# Patient Record
Sex: MALE | Race: WHITE | NOT HISPANIC OR LATINO | Employment: OTHER | URBAN - METROPOLITAN AREA
[De-identification: names, ages, dates, MRNs, and addresses within clinical notes are randomized per-mention and may not be internally consistent; named-entity substitution may affect disease eponyms.]

---

## 2023-04-19 LAB — URATE (MG/DL) IN SER/PLAS: 7.8 MG/DL (ref 4–7.5)

## 2023-07-06 LAB
ALANINE AMINOTRANSFERASE (SGPT) (U/L) IN SER/PLAS: 22 U/L (ref 10–52)
ALBUMIN (G/DL) IN SER/PLAS: 4.3 G/DL (ref 3.4–5)
ALKALINE PHOSPHATASE (U/L) IN SER/PLAS: 70 U/L (ref 33–136)
ANION GAP IN SER/PLAS: 11 MMOL/L (ref 10–20)
ASPARTATE AMINOTRANSFERASE (SGOT) (U/L) IN SER/PLAS: 21 U/L (ref 9–39)
BILIRUBIN TOTAL (MG/DL) IN SER/PLAS: 0.4 MG/DL (ref 0–1.2)
CALCIUM (MG/DL) IN SER/PLAS: 9.8 MG/DL (ref 8.6–10.6)
CARBON DIOXIDE, TOTAL (MMOL/L) IN SER/PLAS: 29 MMOL/L (ref 21–32)
CHLORIDE (MMOL/L) IN SER/PLAS: 104 MMOL/L (ref 98–107)
CREATININE (MG/DL) IN SER/PLAS: 0.99 MG/DL (ref 0.5–1.3)
ERYTHROCYTE DISTRIBUTION WIDTH (RATIO) BY AUTOMATED COUNT: 12.1 % (ref 11.5–14.5)
ERYTHROCYTE MEAN CORPUSCULAR HEMOGLOBIN CONCENTRATION (G/DL) BY AUTOMATED: 34.3 G/DL (ref 32–36)
ERYTHROCYTE MEAN CORPUSCULAR VOLUME (FL) BY AUTOMATED COUNT: 96 FL (ref 80–100)
ERYTHROCYTES (10*6/UL) IN BLOOD BY AUTOMATED COUNT: 3.75 X10E12/L (ref 4.5–5.9)
GFR MALE: 81 ML/MIN/1.73M2
GLUCOSE (MG/DL) IN SER/PLAS: 92 MG/DL (ref 74–99)
HEMATOCRIT (%) IN BLOOD BY AUTOMATED COUNT: 35.9 % (ref 41–52)
HEMOGLOBIN (G/DL) IN BLOOD: 12.3 G/DL (ref 13.5–17.5)
LEUKOCYTES (10*3/UL) IN BLOOD BY AUTOMATED COUNT: 5.9 X10E9/L (ref 4.4–11.3)
NRBC (PER 100 WBCS) BY AUTOMATED COUNT: 0 /100 WBC (ref 0–0)
PLATELETS (10*3/UL) IN BLOOD AUTOMATED COUNT: 262 X10E9/L (ref 150–450)
POTASSIUM (MMOL/L) IN SER/PLAS: 4.7 MMOL/L (ref 3.5–5.3)
PROTEIN TOTAL: 6.7 G/DL (ref 6.4–8.2)
SODIUM (MMOL/L) IN SER/PLAS: 139 MMOL/L (ref 136–145)
THYROTROPIN (MIU/L) IN SER/PLAS BY DETECTION LIMIT <= 0.05 MIU/L: 1.24 MIU/L (ref 0.44–3.98)
UREA NITROGEN (MG/DL) IN SER/PLAS: 18 MG/DL (ref 6–23)

## 2023-10-11 DIAGNOSIS — E78.5 MILD HYPERLIPIDEMIA: Primary | ICD-10-CM

## 2023-10-15 RX ORDER — ATORVASTATIN CALCIUM 20 MG/1
20 TABLET, FILM COATED ORAL DAILY
Qty: 90 TABLET | Refills: 3 | Status: SHIPPED | OUTPATIENT
Start: 2023-10-15

## 2023-12-30 DIAGNOSIS — I10 PRIMARY HYPERTENSION: ICD-10-CM

## 2024-01-05 RX ORDER — LISINOPRIL 20 MG/1
20 TABLET ORAL DAILY
Qty: 90 TABLET | Refills: 1 | Status: SHIPPED | OUTPATIENT
Start: 2024-01-05 | End: 2024-03-13

## 2024-02-27 DIAGNOSIS — I10 PRIMARY HYPERTENSION: Primary | ICD-10-CM

## 2024-02-28 PROBLEM — R91.8 PULMONARY NODULES/LESIONS, MULTIPLE: Status: RESOLVED | Noted: 2024-02-28 | Resolved: 2024-02-28

## 2024-02-28 PROBLEM — R06.09 EXERTIONAL DYSPNEA: Status: RESOLVED | Noted: 2024-02-28 | Resolved: 2024-02-28

## 2024-02-28 PROBLEM — H93.13 BILATERAL TINNITUS: Status: RESOLVED | Noted: 2022-12-20 | Resolved: 2024-02-28

## 2024-02-28 PROBLEM — N40.0 ENLARGED PROSTATE WITHOUT LOWER URINARY TRACT SYMPTOMS (LUTS): Status: RESOLVED | Noted: 2024-02-28 | Resolved: 2024-02-28

## 2024-02-28 PROBLEM — R09.81 SINUS CONGESTION: Status: RESOLVED | Noted: 2024-02-28 | Resolved: 2024-02-28

## 2024-02-28 PROBLEM — I49.3 FREQUENT PVCS: Status: RESOLVED | Noted: 2024-02-28 | Resolved: 2024-02-28

## 2024-02-28 PROBLEM — M17.12 ARTHRITIS OF KNEE, LEFT: Status: ACTIVE | Noted: 2024-02-28

## 2024-02-28 PROBLEM — U07.1 COVID-19: Status: RESOLVED | Noted: 2024-02-28 | Resolved: 2024-02-28

## 2024-02-28 PROBLEM — I10 HYPERTENSION: Status: ACTIVE | Noted: 2024-02-28

## 2024-02-28 PROBLEM — R09.81 NASAL CONGESTION: Status: RESOLVED | Noted: 2024-02-28 | Resolved: 2024-02-28

## 2024-02-28 PROBLEM — H90.3 SENSORINEURAL HEARING LOSS, BILATERAL: Status: ACTIVE | Noted: 2022-12-20

## 2024-02-28 PROBLEM — E78.5 HYPERLIPIDEMIA: Status: ACTIVE | Noted: 2024-02-28

## 2024-02-28 PROBLEM — L53.9 LEG ERYTHEMA: Status: RESOLVED | Noted: 2024-02-28 | Resolved: 2024-02-28

## 2024-02-28 PROBLEM — M79.89 LEG SWELLING: Status: RESOLVED | Noted: 2024-02-28 | Resolved: 2024-02-28

## 2024-02-28 PROBLEM — M25.562 LEFT KNEE PAIN: Status: RESOLVED | Noted: 2024-02-28 | Resolved: 2024-02-28

## 2024-02-28 PROBLEM — J02.9 SORE THROAT: Status: RESOLVED | Noted: 2024-02-28 | Resolved: 2024-02-28

## 2024-02-28 PROBLEM — E55.9 VITAMIN D DEFICIENCY: Status: ACTIVE | Noted: 2024-02-28

## 2024-02-28 RX ORDER — FLUTICASONE FUROATE AND VILANTEROL TRIFENATATE 100; 25 UG/1; UG/1
1 POWDER RESPIRATORY (INHALATION) DAILY
COMMUNITY

## 2024-02-28 RX ORDER — ASCORBIC ACID 500 MG
TABLET ORAL
COMMUNITY
Start: 2004-01-22

## 2024-02-28 RX ORDER — CHLORTHALIDONE 25 MG/1
1 TABLET ORAL DAILY
COMMUNITY
Start: 2021-11-23

## 2024-02-28 RX ORDER — CHLORTHALIDONE 25 MG/1
25 TABLET ORAL DAILY
Qty: 90 TABLET | Refills: 1 | Status: SHIPPED | OUTPATIENT
Start: 2024-02-28 | End: 2024-05-08

## 2024-02-28 RX ORDER — FLECAINIDE ACETATE 50 MG/1
TABLET ORAL
COMMUNITY
Start: 2018-07-19 | End: 2024-05-17 | Stop reason: SDUPTHER

## 2024-02-28 RX ORDER — ASPIRIN 81 MG/1
1 TABLET ORAL DAILY
COMMUNITY
Start: 2016-11-17

## 2024-03-11 DIAGNOSIS — I10 PRIMARY HYPERTENSION: ICD-10-CM

## 2024-03-13 RX ORDER — LISINOPRIL 20 MG/1
20 TABLET ORAL DAILY
Qty: 90 TABLET | Refills: 0 | Status: SHIPPED | OUTPATIENT
Start: 2024-03-13 | End: 2024-05-09

## 2024-05-07 DIAGNOSIS — I10 PRIMARY HYPERTENSION: ICD-10-CM

## 2024-05-08 DIAGNOSIS — I10 PRIMARY HYPERTENSION: ICD-10-CM

## 2024-05-08 RX ORDER — CHLORTHALIDONE 25 MG/1
25 TABLET ORAL DAILY
Qty: 100 TABLET | Refills: 1 | Status: SHIPPED | OUTPATIENT
Start: 2024-05-08

## 2024-05-09 RX ORDER — LISINOPRIL 20 MG/1
20 TABLET ORAL DAILY
Qty: 90 TABLET | Refills: 3 | Status: SHIPPED | OUTPATIENT
Start: 2024-05-09

## 2024-05-17 DIAGNOSIS — I49.3 PVC (PREMATURE VENTRICULAR CONTRACTION): ICD-10-CM

## 2024-05-17 RX ORDER — FLECAINIDE ACETATE 50 MG/1
50 TABLET ORAL 2 TIMES DAILY
Qty: 180 TABLET | Refills: 3 | Status: SHIPPED | OUTPATIENT
Start: 2024-05-17 | End: 2025-05-17

## 2024-06-03 ENCOUNTER — OFFICE VISIT (OUTPATIENT)
Dept: PRIMARY CARE | Facility: CLINIC | Age: 74
End: 2024-06-03
Payer: COMMERCIAL

## 2024-06-03 VITALS
HEART RATE: 70 BPM | SYSTOLIC BLOOD PRESSURE: 124 MMHG | DIASTOLIC BLOOD PRESSURE: 64 MMHG | RESPIRATION RATE: 18 BRPM | WEIGHT: 206 LBS | BODY MASS INDEX: 29.56 KG/M2 | OXYGEN SATURATION: 99 %

## 2024-06-03 DIAGNOSIS — Z13.1 DIABETES MELLITUS SCREENING: ICD-10-CM

## 2024-06-03 DIAGNOSIS — I10 PRIMARY HYPERTENSION: ICD-10-CM

## 2024-06-03 DIAGNOSIS — N52.9 ERECTILE DYSFUNCTION, UNSPECIFIED ERECTILE DYSFUNCTION TYPE: ICD-10-CM

## 2024-06-03 DIAGNOSIS — Z00.00 MEDICARE ANNUAL WELLNESS VISIT, SUBSEQUENT: ICD-10-CM

## 2024-06-03 DIAGNOSIS — Z12.5 SCREENING FOR PROSTATE CANCER: ICD-10-CM

## 2024-06-03 DIAGNOSIS — Z00.00 ROUTINE GENERAL MEDICAL EXAMINATION AT HEALTH CARE FACILITY: Primary | ICD-10-CM

## 2024-06-03 DIAGNOSIS — E78.5 HYPERLIPIDEMIA, UNSPECIFIED HYPERLIPIDEMIA TYPE: ICD-10-CM

## 2024-06-03 DIAGNOSIS — Z11.59 NEED FOR HEPATITIS C SCREENING TEST: ICD-10-CM

## 2024-06-03 PROCEDURE — 1170F FXNL STATUS ASSESSED: CPT | Performed by: PHYSICIAN ASSISTANT

## 2024-06-03 PROCEDURE — 1036F TOBACCO NON-USER: CPT | Performed by: PHYSICIAN ASSISTANT

## 2024-06-03 PROCEDURE — 99214 OFFICE O/P EST MOD 30 MIN: CPT | Performed by: PHYSICIAN ASSISTANT

## 2024-06-03 PROCEDURE — 3074F SYST BP LT 130 MM HG: CPT | Performed by: PHYSICIAN ASSISTANT

## 2024-06-03 PROCEDURE — 3078F DIAST BP <80 MM HG: CPT | Performed by: PHYSICIAN ASSISTANT

## 2024-06-03 PROCEDURE — G0439 PPPS, SUBSEQ VISIT: HCPCS | Performed by: PHYSICIAN ASSISTANT

## 2024-06-03 PROCEDURE — 1124F ACP DISCUSS-NO DSCNMKR DOCD: CPT | Performed by: PHYSICIAN ASSISTANT

## 2024-06-03 PROCEDURE — 1159F MED LIST DOCD IN RCRD: CPT | Performed by: PHYSICIAN ASSISTANT

## 2024-06-03 RX ORDER — SILDENAFIL 100 MG/1
100 TABLET, FILM COATED ORAL DAILY PRN
Qty: 12 TABLET | Refills: 1 | Status: SHIPPED | OUTPATIENT
Start: 2024-06-03 | End: 2025-06-03

## 2024-06-03 RX ORDER — SILDENAFIL 100 MG/1
100 TABLET, FILM COATED ORAL DAILY PRN
Qty: 12 TABLET | Refills: 1 | Status: SHIPPED | OUTPATIENT
Start: 2024-06-03 | End: 2024-06-03 | Stop reason: SDUPTHER

## 2024-06-03 ASSESSMENT — ENCOUNTER SYMPTOMS
ENDOCRINE NEGATIVE: 1
LOSS OF SENSATION IN FEET: 0
DEPRESSION: 0
PALPITATIONS: 0
PSYCHIATRIC NEGATIVE: 1
OCCASIONAL FEELINGS OF UNSTEADINESS: 0
CARDIOVASCULAR NEGATIVE: 1
MUSCULOSKELETAL NEGATIVE: 1
COUGH: 0
ABDOMINAL PAIN: 0
GASTROINTESTINAL NEGATIVE: 1
ALLERGIC/IMMUNOLOGIC NEGATIVE: 1
NERVOUS/ANXIOUS: 0
HEMATOLOGIC/LYMPHATIC NEGATIVE: 1
HEADACHES: 0
VOMITING: 0
BACK PAIN: 0
DIZZINESS: 0
ARTHRALGIAS: 0
EYES NEGATIVE: 1
NAUSEA: 0
SHORTNESS OF BREATH: 0
RESPIRATORY NEGATIVE: 1
NEUROLOGICAL NEGATIVE: 1
CONSTITUTIONAL NEGATIVE: 1
WHEEZING: 0

## 2024-06-03 ASSESSMENT — ACTIVITIES OF DAILY LIVING (ADL)
DOING_HOUSEWORK: INDEPENDENT
TAKING_MEDICATION: INDEPENDENT
GROCERY_SHOPPING: INDEPENDENT
BATHING: INDEPENDENT
MANAGING_FINANCES: INDEPENDENT
DRESSING: INDEPENDENT

## 2024-06-03 ASSESSMENT — PATIENT HEALTH QUESTIONNAIRE - PHQ9
2. FEELING DOWN, DEPRESSED OR HOPELESS: NOT AT ALL
1. LITTLE INTEREST OR PLEASURE IN DOING THINGS: NOT AT ALL
SUM OF ALL RESPONSES TO PHQ9 QUESTIONS 1 AND 2: 0

## 2024-06-03 NOTE — PROGRESS NOTES
Subjective   Reason for Visit: Jarrod Mosley Jr. is an 73 y.o. male here for a Medicare Wellness visit.     Past Medical, Surgical, and Family History reviewed and updated in chart.    Reviewed all medications by prescribing practitioner or clinical pharmacist (such as prescriptions, OTCs, herbal therapies and supplements) and documented in the medical record.    HPI  The patient is being seen for the subsequent annual wellness visit.   Past Medical, Surgical and Family History: reviewed and updated in chart.   Interval History: Patient has not been hospitalized previously.   Medications and Supplements:   No, the patient is not using opioids.   Patient Self Assessment of Health Status: good.   Tobacco use: Non-User   Alcohol use: User   Illicit drug use: Non-User   Current diet: well balanced diet,~does consume adequate fluids~and~does not consume caffeine.   Exercise Frequency: regularly.   Depression/Suicide Screening: ~.   During the past 2 weeks, the patient has not felt down, depressed or hopeless.~   During the past 2 weeks, the patient has not felt little interest or pleasure in doing things.    Hearing Impairment: none.   Cognitive Impairment: No cognitive impairment observed,~patient or family reported no cognitive impairment.      Bathing: performs independently.   Dressing: performs independently.   Walking: performs independently.   Toileting: performs independently.   Feeding: performs independently.   Personal Hygiene: performs independently.   Bowels: continent.   Bladder: continent.   Managing Finances: performs independently.   Shopping: performs independently.   Managing Medications: performs independently.   Housework / Basic Home Maintenance: performs independently.   Handling Transportation: performs independently.   Preparing Meals: performs independently.   Using the Telephone/ Communication Devices: performs independently.    Falls Risk Screening:. JARROD has not fallen in the last 6  months.   Home safety risk factors: none.   Advance directives:. Patient has living will.~Patient has healthcare POA.         JALEESA NG, 73 year old male is here today for follow up and medicare wellness with physical   hx a fib, HTN- stable on medications - no CP no SOB no edema- sees cardiology   hyperlipidemia- on statin - atorvastatin 20 mg daily - no SEs - no muscle aches - last lipid 11/2022-  patient states feeling well otherwise  denies fever, chills, N/V, headache, dizziness, CP, SOB, palpitations, edema, numbness, tingling, weakness  A chaperone was offered to the patient for the physical exam /  exam and was declined      Additional Information: Discussed need to get copy of Advance Directives to scan to chart.   Health has been good. Here for Medicare Wellness/follow up   sees cardiology-  on Flecainide dose BID _     Patient Care Team:  Doris Pop PA-C as PCP - General  Doris Pop PA-C as PCP - [employer] PCP      Mental Status Exam   Date/Time: 5/5   Place: 5/5   Registration: 3/3   Serial Sevens: 5/5   Naming 2 Objects: 2/2   Repeating a Sentence: 1/1   Write a Sentence: 1/1   3-Stage Verbal Command: 3/3   Written Comment: 1/1   Copy a Design: 1/1   Recall: 3/3.   TOTAL SCORE: 30 / 30   PHQ-9     [id number] 09:37AM [id number] 10:09AM   PHQ-9 #1. Little interest or pleasure in doing things 0-Not at all 0-Not at all   PHQ-9 #2. Feeling down, depressed, or hopelesS 0-Not at all 0-Not at all   PHQ-9 #3. Trouble falling or staying asleep, or sleeping too much 0-Not at all 0-Not at all   PHQ-9 #4. Feeling tired or having little energy 0-Not at all 0-Not at all   PHQ-9 #5. Poor appetite or overeating 0-Not at all 0-Not at all   PHQ-9 #6. Feeling bad about yourself or you are a failure or that you have let yourself or your family down 0-Not at all 0-Not at all   PHQ-9 #7. Trouble concentrating on things, such as reading the newspaper or watch television 0-Not at all 0-Not  at all   PHQ-9 #8. Moving or speaking so slowly that other people could have noticed. Or the opposite-being so fidgety or restless that you have been moving around a lot more than usual 0-Not at all 0-Not at all   PHQ-9 #9. Thoughts that you would be better off dead, or of hurting yourself 0-Not at all 0-Not at all   PHQ-9 #10. If you checked off any problems, how difficult have these problems made it for you to do your work, take care of things at home, or get along with other people? Not difficult at all Not difficult at all   PHQ-9 Total Score (Please update problem list based on total score) 0 0   PHQ-9 Depression Severity In Remission (0-4) Mild (5-9)         Review of Systems   Constitutional: Negative.    HENT: Negative.     Eyes: Negative.    Respiratory: Negative.  Negative for cough, shortness of breath and wheezing.    Cardiovascular: Negative.  Negative for chest pain and palpitations.   Gastrointestinal: Negative.  Negative for abdominal pain, nausea and vomiting.   Endocrine: Negative.    Genitourinary: Negative.    Musculoskeletal: Negative.  Negative for arthralgias and back pain.   Skin: Negative.  Negative for rash.   Allergic/Immunologic: Negative.    Neurological: Negative.  Negative for dizziness and headaches.   Hematological: Negative.    Psychiatric/Behavioral: Negative.  The patient is not nervous/anxious.        Objective   Vitals:  /64 (BP Location: Left arm, Patient Position: Sitting)   Pulse 70   Resp 18   Wt 93.4 kg (206 lb)   SpO2 99%   BMI 29.56 kg/m²       Physical Exam  Vitals and nursing note reviewed.   Constitutional:       Appearance: Normal appearance. He is normal weight.   HENT:      Head: Normocephalic and atraumatic.   Cardiovascular:      Rate and Rhythm: Normal rate and regular rhythm.      Heart sounds: Normal heart sounds.   Pulmonary:      Effort: Pulmonary effort is normal.      Breath sounds: Normal breath sounds.   Abdominal:      General: Bowel sounds  are normal.      Palpations: Abdomen is soft.   Genitourinary:     Comments: defer  Musculoskeletal:         General: Normal range of motion.      Cervical back: Neck supple.   Skin:     General: Skin is warm.   Neurological:      General: No focal deficit present.      Mental Status: He is alert and oriented to person, place, and time.   Psychiatric:         Mood and Affect: Mood normal.         Behavior: Behavior normal.         Thought Content: Thought content normal.         Judgment: Judgment normal.       Assessment/Plan   Problem List Items Addressed This Visit       Hyperlipidemia    Relevant Orders    Lipid Panel    Hypertension     Other Visit Diagnoses       Routine general medical examination at health care facility    -  Primary    Relevant Orders    1 Year Follow Up In Primary Care - Wellness Exam    Medicare annual wellness visit, subsequent        Diabetes mellitus screening        Relevant Orders    Comprehensive Metabolic Panel    Need for hepatitis C screening test        Relevant Orders    Hepatitis C antibody    Screening for prostate cancer        Relevant Orders    Prostate Specific Antigen, Screen    Erectile dysfunction, unspecified erectile dysfunction type        Relevant Medications    sildenafil (Viagra) 100 mg tablet          Medicare wellness   - patient stable and healthy  - discussed healthy diet and exercise plan   - continue medications as directed, SEs, risks and options discussed   - f/u with specialists as directed   - dental and vision exams, f/u as directed   - Labs ordered today, will call when results are available   -Vaccinations uTD   -Follow-up annual exam yearly   -Colon cancer screening done 4/2023 sabrina and EGD   -Follow-up in one week to discuss all results      • Hypertension (401.9) (I10)  -stable  -good control  -Continue current medications, side effects, risks and options discussed, patient aware   -Encouraged dietary sodium restriction/ DASH diet  -Recommend  regular aerobic exercise  -Discussed need and benefit for weight loss  -Recheck in 1-2 months or sooner should any symptoms or problems arise  - Goal of blood pressure less than 130/80      • Hyperlipidemia (272.4) (E78.5)  -Continue current medication as directed  -Encouraged following a low-fat low-cholesterol diet   -Discussed the benefits of regular aerobic exercise and weight loss  -Encouraged following a low-carb healthy oil intake diet       • Frequent PVCs (427.69) (I49.3)  -stable followed by cardiology - follow up in June 2024    -ER- trail viagra - 1/2 tab as needed   -Discussed medication dosage, usage, goals of therapy, and side effects       f/u for fasting labs due nov - f/u in nov         ?? gout - left great toe pain at times- no pain today - elevated uric acid 7.8 4/2023- pt does not wish medication at this time         Results of my examination, clinical findings, impression and diagnoses discussed with the patient as well as results of the latest test/lab work. The patient was in agreement with the plan and verbalized a good understanding of instructions and plans for treatment. At the end of the visit today, the patient felt that all questions had been answered satisfactorily. The patient was pleased with the visit and very appreciative for the care rendered.        **Patient Discussion/Summary     Risk Factors Identified During Visit: None.   Influenza: influenza vaccine was previously given.   Pneumovax 23/Prevnar 15: Pneumovax 23/Prevnar 15 vaccine was previously given.   Prevnar 20: Prevnar 20 vaccine was previously given.   Shingles Vaccine: Shingles vaccine was previously given~and~Shingles vaccine was recommended.   Prostate cancer screening: Screening is current.   Colorectal Cancer Screening: screening is current.   Abdominal Aortic Aneurysm screening: screening not indicated.   HIV screening: screening not indicated.

## 2024-06-05 ENCOUNTER — LAB (OUTPATIENT)
Dept: LAB | Facility: LAB | Age: 74
End: 2024-06-05
Payer: COMMERCIAL

## 2024-06-05 DIAGNOSIS — Z13.1 DIABETES MELLITUS SCREENING: ICD-10-CM

## 2024-06-05 DIAGNOSIS — Z11.59 NEED FOR HEPATITIS C SCREENING TEST: ICD-10-CM

## 2024-06-05 DIAGNOSIS — E78.5 HYPERLIPIDEMIA, UNSPECIFIED HYPERLIPIDEMIA TYPE: ICD-10-CM

## 2024-06-05 DIAGNOSIS — Z12.5 SCREENING FOR PROSTATE CANCER: ICD-10-CM

## 2024-06-05 LAB
ALBUMIN SERPL BCP-MCNC: 4.3 G/DL (ref 3.4–5)
ALP SERPL-CCNC: 68 U/L (ref 33–136)
ALT SERPL W P-5'-P-CCNC: 22 U/L (ref 10–52)
ANION GAP SERPL CALC-SCNC: 15 MMOL/L (ref 10–20)
AST SERPL W P-5'-P-CCNC: 23 U/L (ref 9–39)
BILIRUB SERPL-MCNC: 0.6 MG/DL (ref 0–1.2)
BUN SERPL-MCNC: 23 MG/DL (ref 6–23)
CALCIUM SERPL-MCNC: 9.2 MG/DL (ref 8.6–10.6)
CHLORIDE SERPL-SCNC: 101 MMOL/L (ref 98–107)
CHOLEST SERPL-MCNC: 142 MG/DL (ref 0–199)
CHOLESTEROL/HDL RATIO: 3.1
CO2 SERPL-SCNC: 27 MMOL/L (ref 21–32)
CREAT SERPL-MCNC: 1.07 MG/DL (ref 0.5–1.3)
EGFRCR SERPLBLD CKD-EPI 2021: 73 ML/MIN/1.73M*2
GLUCOSE SERPL-MCNC: 108 MG/DL (ref 74–99)
HCV AB SER QL: NONREACTIVE
HDLC SERPL-MCNC: 46.5 MG/DL
LDLC SERPL CALC-MCNC: 76 MG/DL
NON HDL CHOLESTEROL: 96 MG/DL (ref 0–149)
POTASSIUM SERPL-SCNC: 4.2 MMOL/L (ref 3.5–5.3)
PROT SERPL-MCNC: 6.8 G/DL (ref 6.4–8.2)
PSA SERPL-MCNC: 0.57 NG/ML
SODIUM SERPL-SCNC: 139 MMOL/L (ref 136–145)
TRIGL SERPL-MCNC: 100 MG/DL (ref 0–149)
VLDL: 20 MG/DL (ref 0–40)

## 2024-06-05 PROCEDURE — 86803 HEPATITIS C AB TEST: CPT

## 2024-06-05 PROCEDURE — 36415 COLL VENOUS BLD VENIPUNCTURE: CPT

## 2024-06-05 PROCEDURE — 80061 LIPID PANEL: CPT

## 2024-06-05 PROCEDURE — G0103 PSA SCREENING: HCPCS

## 2024-06-05 PROCEDURE — 80053 COMPREHEN METABOLIC PANEL: CPT

## 2024-06-27 ENCOUNTER — APPOINTMENT (OUTPATIENT)
Dept: CARDIOLOGY | Facility: CLINIC | Age: 74
End: 2024-06-27
Payer: COMMERCIAL

## 2024-06-28 DIAGNOSIS — E78.5 MILD HYPERLIPIDEMIA: ICD-10-CM

## 2024-07-01 RX ORDER — ATORVASTATIN CALCIUM 20 MG/1
20 TABLET, FILM COATED ORAL DAILY
Qty: 100 TABLET | Refills: 2 | Status: SHIPPED | OUTPATIENT
Start: 2024-07-01

## 2024-07-11 ENCOUNTER — OFFICE VISIT (OUTPATIENT)
Dept: CARDIOLOGY | Facility: CLINIC | Age: 74
End: 2024-07-11
Payer: COMMERCIAL

## 2024-07-11 ENCOUNTER — HOSPITAL ENCOUNTER (OUTPATIENT)
Dept: CARDIOLOGY | Facility: HOSPITAL | Age: 74
Discharge: HOME | End: 2024-07-11
Payer: COMMERCIAL

## 2024-07-11 VITALS
HEIGHT: 70 IN | TEMPERATURE: 97.9 F | BODY MASS INDEX: 29.78 KG/M2 | DIASTOLIC BLOOD PRESSURE: 72 MMHG | WEIGHT: 208 LBS | HEART RATE: 67 BPM | SYSTOLIC BLOOD PRESSURE: 126 MMHG

## 2024-07-11 DIAGNOSIS — I49.3 PVC (PREMATURE VENTRICULAR CONTRACTION): Primary | ICD-10-CM

## 2024-07-11 PROCEDURE — 1160F RVW MEDS BY RX/DR IN RCRD: CPT | Performed by: NURSE PRACTITIONER

## 2024-07-11 PROCEDURE — 1036F TOBACCO NON-USER: CPT | Performed by: NURSE PRACTITIONER

## 2024-07-11 PROCEDURE — 99214 OFFICE O/P EST MOD 30 MIN: CPT | Performed by: NURSE PRACTITIONER

## 2024-07-11 PROCEDURE — 3074F SYST BP LT 130 MM HG: CPT | Performed by: NURSE PRACTITIONER

## 2024-07-11 PROCEDURE — 1159F MED LIST DOCD IN RCRD: CPT | Performed by: NURSE PRACTITIONER

## 2024-07-11 PROCEDURE — 3078F DIAST BP <80 MM HG: CPT | Performed by: NURSE PRACTITIONER

## 2024-07-11 RX ORDER — FLECAINIDE ACETATE 50 MG/1
50 TABLET ORAL 2 TIMES DAILY
Qty: 180 TABLET | Refills: 3 | Status: SHIPPED | OUTPATIENT
Start: 2024-07-11 | End: 2025-07-11

## 2024-07-11 ASSESSMENT — ENCOUNTER SYMPTOMS
IRREGULAR HEARTBEAT: 0
DIARRHEA: 0
WEAKNESS: 0
COUGH: 0
FALLS: 0
MYALGIAS: 0
LIGHT-HEADEDNESS: 0
SNORING: 0
ORTHOPNEA: 0
SHORTNESS OF BREATH: 0
HEMOPTYSIS: 0
PALPITATIONS: 0
SPUTUM PRODUCTION: 0
NEAR-SYNCOPE: 0
NAUSEA: 0
DYSPNEA ON EXERTION: 0
SORE THROAT: 0
FEVER: 0
ABDOMINAL PAIN: 0
HEADACHES: 0
SYNCOPE: 0
PND: 0
DIAPHORESIS: 0
DOUBLE VISION: 0
DIZZINESS: 0
BLURRED VISION: 0
VOMITING: 0

## 2024-07-11 NOTE — PROGRESS NOTES
Subjective   Jarrod Mosley Jr. is a 73 y.o. male.    Chief Complaint:  Frequent PVCs    Jarrod Mosley is a 73 year-old with history of      1. HTN  2. Seasonal Allergies  3. Frequent PVCs - index diagnosis was approximately two years ago when he was in a trip in [place]. 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 An echocardiogram was said to show mild global LV dysfunction. There was a substantial PVC burden on a Holter monitor, with 15.9% ventricular immature beats. An echocardiogram at  March 11, 2016 was normal with no evidence of LVH. A ZioPatch monitor performed on May 27, 2016 showed isolated PVC, about 3.3% of the beats. In October of 2017 he was coming back from [place] had significant symptoms secondary to PVCs  His PVCs are triggered with lack of sleep, wine occasional coffee. During exercise stress test in November of 2017 at faster heart rates he did not have PVCs. Metoprolol has not been very effective in controlling the PVCs.      July 2018: Flecainide 50 mg twice daily started and he felt dramatically better. He occasionally has PVCs after sleep deprivation.      Aug 2021: Continue Flecainide. EKG shows no signs of Flecainide toxicity.      TESTING:    -MONITOR: (May 2016) Zio 3 days- showed predominant rhythm was NSR. Min HR 53 bpm, Max  bpm, Avg HR 69 bpm. Rare SVEs (Enders <1%) and occ VEs (Enders 3.3%). No AF/AFL, VT, SVT, AVB or pauses.    -TTE: (MAR 2016) LVEF 60-65%. LVIDd 5.6 cm.   - Stress test 11/2017: 1. PVCs suppress with exercise. 2. No ECG changes from baseline. 3. Normal Stress Test. 4. The adequate level of stress was achieved.    ECG 7/11/2024 NSR HR 63 bpm, normal intervals    TODAY Patient presents today for annual follow-up of his PVCs.  He is still taking flecainide 50 mg twice a day and is feeling well.  He denies any episodes of PVCs, but notices that when he is sleep deprived or drinks too much alcohol caffeine he has some palpitations.   "He can exercise without any symptoms.  He denies any dyspnea on exertion, chest pain, dizziness, syncope, palpitations, lower extremity edema or loss of activity tolerance.    /72 (BP Location: Right arm)   Pulse 67   Temp 36.6 °C (97.9 °F)   Ht 1.778 m (5' 10\")   Wt 94.3 kg (208 lb)   BMI 29.84 kg/m²   Current Outpatient Medications on File Prior to Visit   Medication Sig Dispense Refill    ascorbic acid (Vitamin C) 500 mg tablet Take by mouth.      aspirin 81 mg EC tablet Take 1 tablet (81 mg) by mouth once daily.      atorvastatin (Lipitor) 20 mg tablet TAKE 1 TABLET BY MOUTH ONCE  DAILY 100 tablet 2    Breo Ellipta 100-25 mcg/dose inhaler Inhale 1 puff once daily.      chlorthalidone (Hygroton) 25 mg tablet TAKE 1 TABLET BY MOUTH DAILY 100 tablet 1    lisinopril 20 mg tablet TAKE 1 TABLET BY MOUTH DAILY 90 tablet 3    [DISCONTINUED] flecainide (Tambocor) 50 mg tablet Take 1 tablet (50 mg) by mouth 2 times a day. 180 tablet 3    [DISCONTINUED] chlorthalidone (Hygroton) 25 mg tablet Take 1 tablet (25 mg) by mouth once daily.      [DISCONTINUED] LYCOPENE ORAL Take by mouth.      [DISCONTINUED] sildenafil (Viagra) 100 mg tablet Take 1 tablet (100 mg) by mouth once daily as needed for erectile dysfunction. 12 tablet 1     No current facility-administered medications on file prior to visit.         Review of Systems   Constitutional: Negative for diaphoresis, fever and malaise/fatigue.   HENT:  Negative for congestion and sore throat.    Eyes:  Negative for blurred vision and double vision.   Cardiovascular:  Negative for chest pain, dyspnea on exertion, irregular heartbeat, leg swelling, near-syncope, orthopnea, palpitations, paroxysmal nocturnal dyspnea and syncope.   Respiratory:  Negative for cough, hemoptysis, shortness of breath, snoring and sputum production.    Hematologic/Lymphatic: Negative for bleeding problem.   Skin:  Negative for rash.   Musculoskeletal:  Negative for falls, joint pain and " myalgias.   Gastrointestinal:  Negative for abdominal pain, diarrhea, nausea and vomiting.   Neurological:  Negative for dizziness, headaches, light-headedness and weakness.   All other systems reviewed and are negative.      Objective   Constitutional:       Appearance: Healthy appearance. Not in distress.   Eyes:      Conjunctiva/sclera: Conjunctivae normal.   HENT:      Nose: Nose normal.    Mouth/Throat:      Pharynx: Oropharynx is clear.   Pulmonary:      Effort: Pulmonary effort is normal.      Breath sounds: Normal breath sounds. No wheezing. No rhonchi.   Chest:      Chest wall: Not tender to palpatation.   Cardiovascular:      Normal rate. Regular rhythm.      Murmurs: There is no murmur.      No rub.   Pulses:     Intact distal pulses.   Edema:     Peripheral edema absent.   Abdominal:      General: Bowel sounds are normal.      Palpations: Abdomen is soft.   Musculoskeletal: Normal range of motion.      Cervical back: Neck supple. Skin:     General: Skin is warm and dry.   Neurological:      Mental Status: Alert and oriented to person, place and time.      Motor: Motor function is intact.         Lab Review:   Lab Results   Component Value Date     06/05/2024    K 4.2 06/05/2024     06/05/2024    CO2 27 06/05/2024    BUN 23 06/05/2024    CREATININE 1.07 06/05/2024    GLUCOSE 108 (H) 06/05/2024    CALCIUM 9.2 06/05/2024     Lab Results   Component Value Date    WBC 5.9 07/06/2023    HGB 12.3 (L) 07/06/2023    HCT 35.9 (L) 07/06/2023    MCV 96 07/06/2023     07/06/2023       Assessment/Plan   The encounter diagnosis was PVC (premature ventricular contraction).  Patient is doing well on flecainide therapy.  He avoids excessive alcohol or caffeine and is making sure that he gets enough sleep.  No signs of flecainide toxicity on his ECG.  No murmurs on exam and no cardiac symptoms.  I do not feel strongly about repeating any cardiac testing at this time.    Continue current medications  including flecainide 50 mg twice a day  Follow-up with me in 1 year or sooner as needed

## 2024-11-14 DIAGNOSIS — I10 PRIMARY HYPERTENSION: ICD-10-CM

## 2024-11-17 RX ORDER — CHLORTHALIDONE 25 MG/1
25 TABLET ORAL DAILY
Qty: 100 TABLET | Refills: 1 | Status: SHIPPED | OUTPATIENT
Start: 2024-11-17

## 2024-12-10 LAB
ATRIAL RATE: 63 BPM
P AXIS: 61 DEGREES
P OFFSET: 191 MS
P ONSET: 133 MS
PR INTERVAL: 180 MS
Q ONSET: 223 MS
QRS COUNT: 10 BEATS
QRS DURATION: 96 MS
QT INTERVAL: 430 MS
QTC CALCULATION(BAZETT): 440 MS
QTC FREDERICIA: 437 MS
R AXIS: -2 DEGREES
T AXIS: 42 DEGREES
T OFFSET: 438 MS
VENTRICULAR RATE: 63 BPM

## 2024-12-10 PROCEDURE — 93005 ELECTROCARDIOGRAM TRACING: CPT

## 2025-01-22 DIAGNOSIS — I10 PRIMARY HYPERTENSION: ICD-10-CM

## 2025-01-23 RX ORDER — LISINOPRIL 20 MG/1
20 TABLET ORAL DAILY
Qty: 100 TABLET | Refills: 2 | Status: SHIPPED | OUTPATIENT
Start: 2025-01-23

## 2025-04-27 DIAGNOSIS — E78.5 MILD HYPERLIPIDEMIA: ICD-10-CM

## 2025-04-27 DIAGNOSIS — I10 PRIMARY HYPERTENSION: ICD-10-CM

## 2025-04-27 DIAGNOSIS — I49.3 PVC (PREMATURE VENTRICULAR CONTRACTION): ICD-10-CM

## 2025-04-28 RX ORDER — CHLORTHALIDONE 25 MG/1
25 TABLET ORAL DAILY
Qty: 90 TABLET | Refills: 1 | Status: SHIPPED | OUTPATIENT
Start: 2025-04-28

## 2025-04-28 RX ORDER — ATORVASTATIN CALCIUM 20 MG/1
20 TABLET, FILM COATED ORAL DAILY
Qty: 90 TABLET | Refills: 1 | Status: SHIPPED | OUTPATIENT
Start: 2025-04-28

## 2025-04-28 RX ORDER — FLECAINIDE ACETATE 50 MG/1
50 TABLET ORAL 2 TIMES DAILY
Qty: 200 TABLET | Refills: 0 | Status: SHIPPED | OUTPATIENT
Start: 2025-04-28

## 2025-06-04 ENCOUNTER — APPOINTMENT (OUTPATIENT)
Dept: PRIMARY CARE | Facility: CLINIC | Age: 75
End: 2025-06-04
Payer: COMMERCIAL

## 2025-06-04 VITALS
HEART RATE: 64 BPM | OXYGEN SATURATION: 97 % | DIASTOLIC BLOOD PRESSURE: 84 MMHG | SYSTOLIC BLOOD PRESSURE: 135 MMHG | WEIGHT: 212 LBS | HEIGHT: 70 IN | BODY MASS INDEX: 30.35 KG/M2

## 2025-06-04 DIAGNOSIS — Z12.5 SCREENING FOR PROSTATE CANCER: ICD-10-CM

## 2025-06-04 DIAGNOSIS — E78.5 HYPERLIPIDEMIA, UNSPECIFIED HYPERLIPIDEMIA TYPE: ICD-10-CM

## 2025-06-04 DIAGNOSIS — I48.91 ATRIAL FIBRILLATION, UNSPECIFIED TYPE (MULTI): ICD-10-CM

## 2025-06-04 DIAGNOSIS — Z13.220 SCREENING FOR HYPERLIPIDEMIA: ICD-10-CM

## 2025-06-04 DIAGNOSIS — Z00.00 ROUTINE GENERAL MEDICAL EXAMINATION AT HEALTH CARE FACILITY: ICD-10-CM

## 2025-06-04 DIAGNOSIS — I10 PRIMARY HYPERTENSION: ICD-10-CM

## 2025-06-04 DIAGNOSIS — R35.1 BPH ASSOCIATED WITH NOCTURIA: ICD-10-CM

## 2025-06-04 DIAGNOSIS — N40.1 BPH ASSOCIATED WITH NOCTURIA: ICD-10-CM

## 2025-06-04 DIAGNOSIS — Z00.00 MEDICARE ANNUAL WELLNESS VISIT, SUBSEQUENT: Primary | ICD-10-CM

## 2025-06-04 PROCEDURE — 3008F BODY MASS INDEX DOCD: CPT | Performed by: PHYSICIAN ASSISTANT

## 2025-06-04 PROCEDURE — 3075F SYST BP GE 130 - 139MM HG: CPT | Performed by: PHYSICIAN ASSISTANT

## 2025-06-04 PROCEDURE — 3079F DIAST BP 80-89 MM HG: CPT | Performed by: PHYSICIAN ASSISTANT

## 2025-06-04 PROCEDURE — 1036F TOBACCO NON-USER: CPT | Performed by: PHYSICIAN ASSISTANT

## 2025-06-04 PROCEDURE — 99214 OFFICE O/P EST MOD 30 MIN: CPT | Performed by: PHYSICIAN ASSISTANT

## 2025-06-04 PROCEDURE — 1170F FXNL STATUS ASSESSED: CPT | Performed by: PHYSICIAN ASSISTANT

## 2025-06-04 PROCEDURE — 1160F RVW MEDS BY RX/DR IN RCRD: CPT | Performed by: PHYSICIAN ASSISTANT

## 2025-06-04 PROCEDURE — 1123F ACP DISCUSS/DSCN MKR DOCD: CPT | Performed by: PHYSICIAN ASSISTANT

## 2025-06-04 PROCEDURE — G0439 PPPS, SUBSEQ VISIT: HCPCS | Performed by: PHYSICIAN ASSISTANT

## 2025-06-04 PROCEDURE — 1159F MED LIST DOCD IN RCRD: CPT | Performed by: PHYSICIAN ASSISTANT

## 2025-06-04 RX ORDER — TAMSULOSIN HYDROCHLORIDE 0.4 MG/1
0.4 CAPSULE ORAL DAILY
Qty: 90 CAPSULE | Refills: 1 | Status: SHIPPED | OUTPATIENT
Start: 2025-06-04

## 2025-06-04 ASSESSMENT — PATIENT HEALTH QUESTIONNAIRE - PHQ9
1. LITTLE INTEREST OR PLEASURE IN DOING THINGS: NOT AT ALL
2. FEELING DOWN, DEPRESSED OR HOPELESS: NOT AT ALL
SUM OF ALL RESPONSES TO PHQ9 QUESTIONS 1 AND 2: 0
1. LITTLE INTEREST OR PLEASURE IN DOING THINGS: NOT AT ALL
2. FEELING DOWN, DEPRESSED OR HOPELESS: NOT AT ALL
SUM OF ALL RESPONSES TO PHQ9 QUESTIONS 1 AND 2: 0

## 2025-06-04 ASSESSMENT — ENCOUNTER SYMPTOMS
EYES NEGATIVE: 1
NAUSEA: 0
HEADACHES: 0
PALPITATIONS: 0
LOSS OF SENSATION IN FEET: 0
ABDOMINAL PAIN: 0
HEMATOLOGIC/LYMPHATIC NEGATIVE: 1
OCCASIONAL FEELINGS OF UNSTEADINESS: 0
MUSCULOSKELETAL NEGATIVE: 1
DEPRESSION: 0
ENDOCRINE NEGATIVE: 1
VOMITING: 0
ARTHRALGIAS: 0
DIZZINESS: 0
CONSTITUTIONAL NEGATIVE: 1
WHEEZING: 0
BACK PAIN: 0
PSYCHIATRIC NEGATIVE: 1
COUGH: 0
ALLERGIC/IMMUNOLOGIC NEGATIVE: 1
RESPIRATORY NEGATIVE: 1
SHORTNESS OF BREATH: 0
NERVOUS/ANXIOUS: 0
NEUROLOGICAL NEGATIVE: 1

## 2025-06-04 ASSESSMENT — ACTIVITIES OF DAILY LIVING (ADL)
DRESSING: INDEPENDENT
TAKING_MEDICATION: INDEPENDENT
MANAGING_FINANCES: INDEPENDENT
BATHING: INDEPENDENT
GROCERY_SHOPPING: INDEPENDENT
DOING_HOUSEWORK: INDEPENDENT

## 2025-06-04 NOTE — PROGRESS NOTES
Subjective   Reason for Visit: Jarrod Mosley Jr. is an 74 y.o. male here for a Medicare Wellness visit.     Past Medical, Surgical, and Family History reviewed and updated in chart.    Reviewed all medications by prescribing practitioner or clinical pharmacist (such as prescriptions, OTCs, herbal therapies and supplements) and documented in the medical record.    HPI  The patient is being seen for the subsequent annual wellness visit.   Past Medical, Surgical and Family History: reviewed and updated in chart.   Interval History: Patient has not been hospitalized previously.   Medications and Supplements:   No, the patient is not using opioids.   Patient Self Assessment of Health Status: good.   Tobacco use: Non-User   Alcohol use: User   Illicit drug use: Non-User   Current diet: well balanced diet,~does consume adequate fluids~and~does not consume caffeine.   Exercise Frequency: regularly.   Depression/Suicide Screening: ~.   During the past 2 weeks, the patient has not felt down, depressed or hopeless.~   During the past 2 weeks, the patient has not felt little interest or pleasure in doing things.    Hearing Impairment: none.   Cognitive Impairment: No cognitive impairment observed,~patient or family reported no cognitive impairment.      Bathing: performs independently.   Dressing: performs independently.   Walking: performs independently.   Toileting: performs independently.   Feeding: performs independently.   Personal Hygiene: performs independently.   Bowels: continent.   Bladder: continent.   Managing Finances: performs independently.   Shopping: performs independently.   Managing Medications: performs independently.   Housework / Basic Home Maintenance: performs independently.   Handling Transportation: performs independently.   Preparing Meals: performs independently.   Using the Telephone/ Communication Devices: performs independently.    Falls Risk Screening:. JARROD has not fallen in the last 6  months.   Home safety risk factors: none.   Advance directives:. Patient has living will.~Patient has healthcare POA.         JALEESA NG, 74 year old male is here today for follow up and medicare wellness with physical AND FOLLOW UP     hx a fib, HTN- stable on medications - no CP no SOB no edema- sees cardiology   hyperlipidemia- on statin - atorvastatin 20 mg daily - no SEs - no muscle aches - last lipid 6/2024  Waked 2 x night to urinate -- drinks a lot of water   Plantar fascitis right foot -- able to tolerate with waering certain shoes     patient states feeling well otherwise  denies fever, chills, N/V, headache, dizziness, CP, SOB, palpitations, edema, numbness, tingling, weakness  A chaperone was offered to the patient for the physical exam /  exam and was declined      Additional Information: Discussed need to get copy of Advance Directives to scan to chart.   Health has been good. Here for Medicare Wellness/follow up   sees cardiology-  on Flecainide dose BID _      Patient Care Team:  Doris Pop PA-C as PCP - General  Doris Pop PA-C as PCP - [employer] PCP      Mental Status Exam   Date/Time: 5/5   Place: 5/5   Registration: 3/3   Serial Sevens: 5/5   Naming 2 Objects: 2/2   Repeating a Sentence: 1/1   Write a Sentence: 1/1   3-Stage Verbal Command: 3/3   Written Comment: 1/1   Copy a Design: 1/1   Recall: 3/3.   TOTAL SCORE: 30 / 30   PHQ-9         PHQ-9 #1. Little interest or pleasure in doing things 0-Not at all    PHQ-9 #2. Feeling down, depressed, or hopelesS 0-Not at all    PHQ-9 #3. Trouble falling or staying asleep, or sleeping too much 0-Not at all    PHQ-9 #4. Feeling tired or having little energy 0-Not at all    PHQ-9 #5. Poor appetite or overeating 0-Not at all    PHQ-9 #6. Feeling bad about yourself or you are a failure or that you have let yourself or your family down 0-Not at all    PHQ-9 #7. Trouble concentrating on things, such as reading the  "newspaper or watch television 0-Not at all    PHQ-9 #8. Moving or speaking so slowly that other people could have noticed. Or the opposite-being so fidgety or restless that you have been moving around a lot more than usual 0-Not at all    PHQ-9 #9. Thoughts that you would be better off dead, or of hurting yourself 0-Not at all    PHQ-9 #10. If you checked off any problems, how difficult have these problems made it for you to do your work, take care of things at home, or get along with other people? Not difficult at all    PHQ-9 Total Score (Please update problem list based on total score) 0    PHQ-9 Depression Severity In Remission (0-4)            Patient Care Team:  Doris Pop PA-C as PCP - General  Ruby Sloan MD as PCP - [employer] PCP     Review of Systems   Constitutional: Negative.    HENT: Negative.     Eyes: Negative.    Respiratory: Negative.  Negative for cough, shortness of breath and wheezing.    Cardiovascular:  Negative for chest pain and palpitations.   Gastrointestinal:  Negative for abdominal pain, nausea and vomiting.   Endocrine: Negative.    Genitourinary: Negative.    Musculoskeletal: Negative.  Negative for arthralgias and back pain.   Skin: Negative.  Negative for rash.   Allergic/Immunologic: Negative.    Neurological: Negative.  Negative for dizziness and headaches.   Hematological: Negative.    Psychiatric/Behavioral: Negative.  The patient is not nervous/anxious.        Objective   Vitals:  /84 (BP Location: Left arm, Patient Position: Sitting)   Pulse 64   Ht 1.778 m (5' 10\")   Wt 96.2 kg (212 lb)   SpO2 97%   BMI 30.42 kg/m²       Physical Exam  Vitals and nursing note reviewed.   Constitutional:       Appearance: Normal appearance.   Cardiovascular:      Rate and Rhythm: Normal rate and regular rhythm.   Pulmonary:      Effort: Pulmonary effort is normal.      Breath sounds: Normal breath sounds.   Musculoskeletal:         General: Normal " range of motion.      Cervical back: Neck supple.   Skin:     General: Skin is warm and dry.   Neurological:      General: No focal deficit present.      Mental Status: He is alert and oriented to person, place, and time.   Psychiatric:         Mood and Affect: Mood normal.         Behavior: Behavior normal.         Thought Content: Thought content normal.         Judgment: Judgment normal.       Assessment & Plan  Medicare annual wellness visit, subsequent         Primary hypertension         Hyperlipidemia, unspecified hyperlipidemia type         Medicare wellness   - patient stable and healthy  - discussed healthy diet and exercise plan   - continue medications as directed, SEs, risks and options discussed   - f/u with specialists as directed   - dental and vision exams, f/u as directed   - Labs ordered today, will call when results are available   -Vaccinations uTD   -Follow-up annual exam yearly   -Colon cancer screening done 4/2023 sabrina and EGD   -Follow-up in one week to discuss all results      • Hypertension (401.9) (I10)  -stable  -good control  -Continue current medications, side effects, risks and options discussed, patient aware   -Encouraged dietary sodium restriction/ DASH diet  -Recommend regular aerobic exercise  -Discussed need and benefit for weight loss  -Recheck in 1-2 months or sooner should any symptoms or problems arise  - Goal of blood pressure less than 130/80      • Hyperlipidemia (272.4) (E78.5)  -Continue current medication as directed  -Encouraged following a low-fat low-cholesterol diet   -Discussed the benefits of regular aerobic exercise and weight loss  -Encouraged following a low-carb healthy oil intake diet       • 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 gout - left great toe pain at times- no pain today  - elevated uric acid 7.8 4/2023- pt does not wish medication at this time         Results of my examination, clinical findings, impression and diagnoses discussed with the patient as well as results of the latest test/lab work. The patient was in agreement with the plan and verbalized a good understanding of instructions and plans for treatment. At the end of the visit today, the patient felt that all questions had been answered satisfactorily. The patient was pleased with the visit and very appreciative for the care rendered.     Routine general medical examination at [facility] facility    Orders:  •  1 Year Follow Up In Primary Care - Wellness Exam  •  1 Year Follow Up In Primary Care - Wellness Exam; Future  •  Comprehensive Metabolic Panel; Future    Screening for hyperlipidemia    Orders:  •  Lipid Panel; Future    Screening for prostate cancer    Orders:  •  PSA screen; Future    BPH associated with nocturia    Orders:  •  tamsulosin (Flomax) 0.4 mg 24 hr capsule; Take 1 capsule (0.4 mg) by mouth once daily. Do not crush, chew, or split.           Depression Screening  5 - 10 minutes were spent screening for depression.    Medicare Wellness Billing Compliance Satisfied    *This is a visual tool to show completion of required items on the day of the visit. Green checks will only appear on the date of visit.    Review all medications by prescribing practitioner or clinical pharmacist (such as prescriptions, OTCs, herbal therapies and supplements) documented in the medical record    Past Medical, Surgical, and Family History reviewed and updated in chart    Tobacco Use Reviewed    Alcohol Use Reviewed    Illicit Drug Use Reviewed    PHQ2/9    Falls in Last Year Reviewed    Home Safety Risk Factors Reviewed    Cognitive Impairment Reviewed    Patient Self Assessment and Health Status    Current Diet Reviewed    Exercise Frequency    ADL - Hearing Impairment    ADL - Bathing    ADL - Dressing     ADL - Walks in Home    IADL - Managing Finances    IADL - Grocery Shopping    IADL - Taking Medications    IADL - Doing Housework    Vision Screening

## 2025-06-04 NOTE — ASSESSMENT & PLAN NOTE
Medicare wellness   - patient stable and healthy  - discussed healthy diet and exercise plan   - continue medications as directed, SEs, risks and options discussed   - f/u with specialists as directed   - dental and vision exams, f/u as directed   - Labs ordered today, will call when results are available   -Vaccinations uTD   -Follow-up annual exam yearly   -Colon cancer screening done 4/2023 sabrina and EGD   -Follow-up in one week to discuss all results       Hypertension (401.9) (I10)  -stable  -good control  -Continue current medications, side effects, risks and options discussed, patient aware   -Encouraged dietary sodium restriction/ DASH diet  -Recommend regular aerobic exercise  -Discussed need and benefit for weight loss  -Recheck in 1-2 months or sooner should any symptoms or problems arise  - Goal of blood pressure less than 130/80       Hyperlipidemia (272.4) (E78.5)  -Continue current medication as directed  -Encouraged following a low-fat low-cholesterol diet   -Discussed the benefits of regular aerobic exercise and weight loss  -Encouraged following a low-carb healthy oil intake diet        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 gout - left great toe pain at times- no pain today - elevated uric acid 7.8 4/2023- pt does not wish medication at this time         Results of my examination, clinical findings, impression and diagnoses discussed with the patient as well as results of the latest test/lab work. The patient was in agreement with the plan and verbalized a good understanding of instructions and plans for treatment. At the end of the visit today, the patient felt that all questions had been answered satisfactorily. The patient was pleased with the visit and very appreciative for the care rendered.

## 2025-06-05 LAB
ALBUMIN SERPL-MCNC: 4.4 G/DL (ref 3.6–5.1)
ALP SERPL-CCNC: 68 U/L (ref 35–144)
ALT SERPL-CCNC: 26 U/L (ref 9–46)
ANION GAP SERPL CALCULATED.4IONS-SCNC: 9 MMOL/L (CALC) (ref 7–17)
AST SERPL-CCNC: 25 U/L (ref 10–35)
BILIRUB SERPL-MCNC: 0.5 MG/DL (ref 0.2–1.2)
BUN SERPL-MCNC: 26 MG/DL (ref 7–25)
CALCIUM SERPL-MCNC: 9.4 MG/DL (ref 8.6–10.3)
CHLORIDE SERPL-SCNC: 99 MMOL/L (ref 98–110)
CHOLEST SERPL-MCNC: 138 MG/DL
CHOLEST/HDLC SERPL: 2.7 (CALC)
CO2 SERPL-SCNC: 27 MMOL/L (ref 20–32)
CREAT SERPL-MCNC: 1.29 MG/DL (ref 0.7–1.28)
EGFRCR SERPLBLD CKD-EPI 2021: 58 ML/MIN/1.73M2
GLUCOSE SERPL-MCNC: 101 MG/DL (ref 65–99)
HDLC SERPL-MCNC: 52 MG/DL
LDLC SERPL CALC-MCNC: 69 MG/DL (CALC)
NONHDLC SERPL-MCNC: 86 MG/DL (CALC)
POTASSIUM SERPL-SCNC: 4.5 MMOL/L (ref 3.5–5.3)
PROT SERPL-MCNC: 6.7 G/DL (ref 6.1–8.1)
PSA SERPL-MCNC: 0.6 NG/ML
SODIUM SERPL-SCNC: 135 MMOL/L (ref 135–146)
TRIGL SERPL-MCNC: 85 MG/DL

## 2025-06-12 ENCOUNTER — TELEPHONE (OUTPATIENT)
Dept: PRIMARY CARE | Facility: CLINIC | Age: 75
End: 2025-06-12
Payer: COMMERCIAL

## 2025-06-16 DIAGNOSIS — R35.0 URINE FREQUENCY: ICD-10-CM

## 2025-06-16 DIAGNOSIS — R53.83 FATIGUE, UNSPECIFIED TYPE: ICD-10-CM

## 2025-06-18 LAB
ANION GAP SERPL CALCULATED.4IONS-SCNC: 9 MMOL/L (CALC) (ref 7–17)
BUN SERPL-MCNC: 21 MG/DL (ref 7–25)
BUN/CREAT SERPL: NORMAL (CALC) (ref 6–22)
CALCIUM SERPL-MCNC: 9.6 MG/DL (ref 8.6–10.3)
CHLORIDE SERPL-SCNC: 99 MMOL/L (ref 98–110)
CO2 SERPL-SCNC: 29 MMOL/L (ref 20–32)
CREAT SERPL-MCNC: 0.95 MG/DL (ref 0.7–1.28)
EGFRCR SERPLBLD CKD-EPI 2021: 84 ML/MIN/1.73M2
ERYTHROCYTE [DISTWIDTH] IN BLOOD BY AUTOMATED COUNT: 12.2 % (ref 11–15)
GLUCOSE SERPL-MCNC: 98 MG/DL (ref 65–99)
HCT VFR BLD AUTO: 40.9 % (ref 38.5–50)
HGB BLD-MCNC: 13.7 G/DL (ref 13.2–17.1)
MCH RBC QN AUTO: 33.5 PG (ref 27–33)
MCHC RBC AUTO-ENTMCNC: 33.5 G/DL (ref 32–36)
MCV RBC AUTO: 100 FL (ref 80–100)
PLATELET # BLD AUTO: 255 THOUSAND/UL (ref 140–400)
PMV BLD REES-ECKER: 10.8 FL (ref 7.5–12.5)
POTASSIUM SERPL-SCNC: 4.4 MMOL/L (ref 3.5–5.3)
RBC # BLD AUTO: 4.09 MILLION/UL (ref 4.2–5.8)
SODIUM SERPL-SCNC: 137 MMOL/L (ref 135–146)
TSH SERPL-ACNC: 1.93 MIU/L (ref 0.4–4.5)
WBC # BLD AUTO: 5.6 THOUSAND/UL (ref 3.8–10.8)

## 2025-06-19 NOTE — RESULT ENCOUNTER NOTE
Please call and inform pt that all labs are stable - continue low fat cholesterol low sugar healthy diet with exercise multivitamin and vitamin D daily   And call office with any further questions or concerns

## 2025-06-20 PROBLEM — J30.9 ALLERGIC RHINITIS: Status: ACTIVE | Noted: 2025-06-20

## 2025-06-20 PROBLEM — M79.676 PAIN OF GREAT TOE: Status: ACTIVE | Noted: 2025-06-20

## 2025-06-20 PROBLEM — R53.83 FATIGUE: Status: ACTIVE | Noted: 2025-06-20

## 2025-06-20 PROBLEM — J45.30 MILD PERSISTENT ASTHMA WITHOUT COMPLICATION (HHS-HCC): Status: ACTIVE | Noted: 2025-06-20

## 2025-06-20 PROBLEM — N40.0 BENIGN PROSTATIC HYPERPLASIA: Status: ACTIVE | Noted: 2025-06-20

## 2025-06-20 PROBLEM — E66.9 OBESITY: Status: ACTIVE | Noted: 2025-06-20

## 2025-06-20 PROBLEM — R07.9 CHEST PAIN: Status: ACTIVE | Noted: 2025-06-20

## 2025-06-23 ENCOUNTER — OFFICE VISIT (OUTPATIENT)
Dept: CARDIOLOGY | Facility: CLINIC | Age: 75
End: 2025-06-23
Payer: COMMERCIAL

## 2025-06-23 VITALS
HEIGHT: 70 IN | OXYGEN SATURATION: 93 % | BODY MASS INDEX: 30.39 KG/M2 | SYSTOLIC BLOOD PRESSURE: 125 MMHG | HEART RATE: 66 BPM | DIASTOLIC BLOOD PRESSURE: 74 MMHG | WEIGHT: 212.3 LBS

## 2025-06-23 DIAGNOSIS — E78.5 HYPERLIPIDEMIA, UNSPECIFIED HYPERLIPIDEMIA TYPE: ICD-10-CM

## 2025-06-23 DIAGNOSIS — I10 PRIMARY HYPERTENSION: Primary | ICD-10-CM

## 2025-06-23 DIAGNOSIS — I49.3 PVC'S (PREMATURE VENTRICULAR CONTRACTIONS): ICD-10-CM

## 2025-06-23 DIAGNOSIS — R06.83 SNORING: ICD-10-CM

## 2025-06-23 DIAGNOSIS — R53.83 OTHER FATIGUE: ICD-10-CM

## 2025-06-23 DIAGNOSIS — I25.10 CORONARY ARTERY CALCIFICATION SEEN ON CT SCAN: ICD-10-CM

## 2025-06-23 DIAGNOSIS — I10 HYPERTENSION, UNSPECIFIED TYPE: ICD-10-CM

## 2025-06-23 PROBLEM — R07.9 CHEST PAIN: Status: RESOLVED | Noted: 2025-06-20 | Resolved: 2025-06-23

## 2025-06-23 PROBLEM — I48.91 ATRIAL FIBRILLATION, UNSPECIFIED TYPE (MULTI): Status: RESOLVED | Noted: 2025-06-04 | Resolved: 2025-06-23

## 2025-06-23 PROCEDURE — 3008F BODY MASS INDEX DOCD: CPT | Performed by: INTERNAL MEDICINE

## 2025-06-23 PROCEDURE — 3078F DIAST BP <80 MM HG: CPT | Performed by: INTERNAL MEDICINE

## 2025-06-23 PROCEDURE — 99214 OFFICE O/P EST MOD 30 MIN: CPT | Performed by: INTERNAL MEDICINE

## 2025-06-23 PROCEDURE — 3074F SYST BP LT 130 MM HG: CPT | Performed by: INTERNAL MEDICINE

## 2025-06-23 PROCEDURE — 1126F AMNT PAIN NOTED NONE PRSNT: CPT | Performed by: INTERNAL MEDICINE

## 2025-06-23 PROCEDURE — 99202 OFFICE O/P NEW SF 15 MIN: CPT | Mod: 25 | Performed by: INTERNAL MEDICINE

## 2025-06-23 PROCEDURE — 93005 ELECTROCARDIOGRAM TRACING: CPT | Performed by: INTERNAL MEDICINE

## 2025-06-23 PROCEDURE — 1036F TOBACCO NON-USER: CPT | Performed by: INTERNAL MEDICINE

## 2025-06-23 PROCEDURE — 1159F MED LIST DOCD IN RCRD: CPT | Performed by: INTERNAL MEDICINE

## 2025-06-23 PROCEDURE — 93010 ELECTROCARDIOGRAM REPORT: CPT | Performed by: INTERNAL MEDICINE

## 2025-06-23 ASSESSMENT — ENCOUNTER SYMPTOMS
VOMITING: 0
DIARRHEA: 0
MYALGIAS: 0
FEVER: 0
ALTERED MENTAL STATUS: 0
HEADACHES: 0
BLOATING: 0
OCCASIONAL FEELINGS OF UNSTEADINESS: 0
ABDOMINAL PAIN: 0
HEMOPTYSIS: 0
DYSURIA: 0
CONSTIPATION: 0
MEMORY LOSS: 0
DEPRESSION: 0
NAUSEA: 0
WHEEZING: 0
HEMATURIA: 0
FALLS: 0
LOSS OF SENSATION IN FEET: 0
CHILLS: 0
COUGH: 0

## 2025-06-23 ASSESSMENT — COLUMBIA-SUICIDE SEVERITY RATING SCALE - C-SSRS
2. HAVE YOU ACTUALLY HAD ANY THOUGHTS OF KILLING YOURSELF?: NO
6. HAVE YOU EVER DONE ANYTHING, STARTED TO DO ANYTHING, OR PREPARED TO DO ANYTHING TO END YOUR LIFE?: NO
1. IN THE PAST MONTH, HAVE YOU WISHED YOU WERE DEAD OR WISHED YOU COULD GO TO SLEEP AND NOT WAKE UP?: NO

## 2025-06-23 ASSESSMENT — PAIN SCALES - GENERAL: PAINLEVEL_OUTOF10: 0-NO PAIN

## 2025-06-23 NOTE — PROGRESS NOTES
Chief Complaint   Patient presents with    PVCs    Hypertension    Hyperlipidemia      HPI  75 yo WM w/ h/o PVCs, CAC, LAD bridge, HTN, HLD now here for cardiology consult. No chest pain. No dyspnea at rest. No URIARTE. No orthopnea/PND. +rare palps x seconds, kenny if doesn't sleep enough or has ETOH, less on Flecainide. +occ LH, less on reduced meds. No syncope. No edema. No claudication. No cough. +occ fatigue. +occ snoring.  BP at home: ~130/80  ECG 8/21: SR (65), normal  ECG 12/24: SR (63), normal  ECG 6/25: SR (66), normal  HM '16 ([facility]): PVCs 16%  HM 5/16: SR, HR  (avg 69), PVCs 3.3%, no AFIB  Echo 3/16: EF 60-65%, up nl LA  ETT 11/17: no ischemia, PVCs suppress w/ exercise  Cath '16 ([facility]): no CAD, +LAD bridge  CT chest 5/18: mod CAC, nl heart size, no peric eff, nl Ao, nl PA    Review of Systems   Constitutional: Negative for chills, fever and malaise/fatigue.   HENT:  Negative for hearing loss.    Eyes:  Negative for visual disturbance.   Respiratory:  Negative for cough, hemoptysis and wheezing.    Skin:  Negative for rash.   Musculoskeletal:  Negative for falls and myalgias.   Gastrointestinal:  Negative for bloating, abdominal pain, constipation, diarrhea, dysphagia, nausea and vomiting.   Genitourinary:  Negative for dysuria and hematuria.   Neurological:  Negative for headaches.   Psychiatric/Behavioral:  Negative for altered mental status, depression and memory loss.       Social History     Tobacco Use    Smoking status: Never    Smokeless tobacco: Never   Substance Use Topics    Alcohol use: Yes     Comment: socially      Family History[1]     RX Allergies[2]     Current Outpatient Medications   Medication Instructions    aspirin 81 mg EC tablet 1 tablet, Daily    atorvastatin (LIPITOR) 20 mg, oral, Daily    flecainide (TAMBOCOR) 50 mg, oral, 2 times daily    lisinopril 20 mg, oral, Daily      Vitals:    06/23/25 0904   BP: 125/74   Pulse: 66   SpO2: 93%      Physical Exam  Constitutional:        Appearance: Normal appearance.   HENT:      Head: Normocephalic and atraumatic.      Nose: Nose normal.   Neck:      Vascular: No carotid bruit.   Cardiovascular:      Rate and Rhythm: Normal rate and regular rhythm.      Heart sounds: No murmur heard.  Pulmonary:      Effort: Pulmonary effort is normal.      Breath sounds: Normal breath sounds.   Abdominal:      Palpations: Abdomen is soft.      Tenderness: There is no abdominal tenderness.   Musculoskeletal:      Right lower leg: No edema.      Left lower leg: No edema.   Skin:     General: Skin is warm and dry.   Neurological:      General: No focal deficit present.      Mental Status: He is alert.   Psychiatric:         Mood and Affect: Mood normal.         Judgment: Judgment normal.        Labs  6/25 Cr 0.95, K 4.4, LDL 69, HDL 52, TG 85, Chol 138, HGB 13.7, , TSH 1.93    Assessment/Plan   73 yo WM w/ h/o PVCs, CAC, LAD bridge, HTN, HLD. Doing well. PVCs/palps mostly suppressed with Flecainide (if has, usually after poor sleep or ETOH). If increase, consider repeat monitor or just increasing Flecainide. For occ fatigue/snoring, check sleep study.  -continue ASA 81 every day  -continue Flecainide 50 bid  -continue Lisinopril 20 every day  -continue Atorva 20 every day   -FU 1 year (earlier if needed)    Eder Sun MD       [1]   Family History  Problem Relation Name Age of Onset    Coronary artery disease Father     [2]   Allergies  Allergen Reactions    Bee Pollen Unknown    Iodinated Contrast Media Rash

## 2025-06-25 LAB
ATRIAL RATE: 66 BPM
P AXIS: 55 DEGREES
P OFFSET: 189 MS
P ONSET: 131 MS
PR INTERVAL: 180 MS
Q ONSET: 221 MS
QRS COUNT: 11 BEATS
QRS DURATION: 94 MS
QT INTERVAL: 404 MS
QTC CALCULATION(BAZETT): 423 MS
QTC FREDERICIA: 417 MS
R AXIS: -9 DEGREES
T AXIS: 30 DEGREES
T OFFSET: 423 MS
VENTRICULAR RATE: 66 BPM

## 2025-07-06 ENCOUNTER — CLINICAL SUPPORT (OUTPATIENT)
Dept: SLEEP MEDICINE | Facility: HOSPITAL | Age: 75
End: 2025-07-06
Payer: COMMERCIAL

## 2025-07-06 DIAGNOSIS — I49.3 PVC (PREMATURE VENTRICULAR CONTRACTION): ICD-10-CM

## 2025-07-06 DIAGNOSIS — R53.83 OTHER FATIGUE: ICD-10-CM

## 2025-07-06 DIAGNOSIS — I49.3 PVC'S (PREMATURE VENTRICULAR CONTRACTIONS): ICD-10-CM

## 2025-07-06 DIAGNOSIS — E78.5 MILD HYPERLIPIDEMIA: ICD-10-CM

## 2025-07-06 DIAGNOSIS — R06.83 SNORING: ICD-10-CM

## 2025-07-06 PROCEDURE — 95806 SLEEP STUDY UNATT&RESP EFFT: CPT | Performed by: PSYCHIATRY & NEUROLOGY

## 2025-07-07 RX ORDER — FLECAINIDE ACETATE 50 MG/1
50 TABLET ORAL 2 TIMES DAILY
Qty: 200 TABLET | Refills: 2 | Status: SHIPPED | OUTPATIENT
Start: 2025-07-07

## 2025-07-07 RX ORDER — ATORVASTATIN CALCIUM 20 MG/1
20 TABLET, FILM COATED ORAL DAILY
Qty: 90 TABLET | Refills: 3 | Status: SHIPPED | OUTPATIENT
Start: 2025-07-07

## 2025-08-18 ENCOUNTER — ANCILLARY PROCEDURE (OUTPATIENT)
Dept: URGENT CARE | Age: 75
End: 2025-08-18
Payer: COMMERCIAL

## 2025-08-18 ENCOUNTER — OFFICE VISIT (OUTPATIENT)
Dept: URGENT CARE | Age: 75
End: 2025-08-18
Payer: COMMERCIAL

## 2025-08-18 VITALS
DIASTOLIC BLOOD PRESSURE: 61 MMHG | TEMPERATURE: 98.3 F | RESPIRATION RATE: 15 BRPM | OXYGEN SATURATION: 97 % | HEART RATE: 64 BPM | SYSTOLIC BLOOD PRESSURE: 111 MMHG

## 2025-08-18 DIAGNOSIS — L03.115 CELLULITIS OF RIGHT LOWER EXTREMITY: Primary | ICD-10-CM

## 2025-08-18 DIAGNOSIS — M79.671 ACUTE FOOT PAIN, RIGHT: ICD-10-CM

## 2025-08-18 PROCEDURE — 1160F RVW MEDS BY RX/DR IN RCRD: CPT | Performed by: NURSE PRACTITIONER

## 2025-08-18 PROCEDURE — 3074F SYST BP LT 130 MM HG: CPT | Performed by: NURSE PRACTITIONER

## 2025-08-18 PROCEDURE — 99203 OFFICE O/P NEW LOW 30 MIN: CPT | Performed by: NURSE PRACTITIONER

## 2025-08-18 PROCEDURE — 73630 X-RAY EXAM OF FOOT: CPT | Mod: RIGHT SIDE | Performed by: NURSE PRACTITIONER

## 2025-08-18 PROCEDURE — 1036F TOBACCO NON-USER: CPT | Performed by: NURSE PRACTITIONER

## 2025-08-18 PROCEDURE — 3078F DIAST BP <80 MM HG: CPT | Performed by: NURSE PRACTITIONER

## 2025-08-18 PROCEDURE — 1159F MED LIST DOCD IN RCRD: CPT | Performed by: NURSE PRACTITIONER

## 2025-08-18 RX ORDER — DOXYCYCLINE 100 MG/1
100 CAPSULE ORAL 2 TIMES DAILY
Qty: 14 CAPSULE | Refills: 0 | Status: SHIPPED | OUTPATIENT
Start: 2025-08-18 | End: 2025-08-25

## 2025-08-18 ASSESSMENT — ENCOUNTER SYMPTOMS
ARTHRALGIAS: 1
FEVER: 0
COLOR CHANGE: 1
NUMBNESS: 0
WOUND: 0
ACTIVITY CHANGE: 0
FATIGUE: 0
JOINT SWELLING: 0

## 2025-08-22 ENCOUNTER — APPOINTMENT (OUTPATIENT)
Dept: UROLOGY | Facility: CLINIC | Age: 75
End: 2025-08-22
Payer: COMMERCIAL

## 2025-11-28 ENCOUNTER — APPOINTMENT (OUTPATIENT)
Dept: UROLOGY | Facility: CLINIC | Age: 75
End: 2025-11-28
Payer: COMMERCIAL

## 2026-06-04 ENCOUNTER — APPOINTMENT (OUTPATIENT)
Dept: PRIMARY CARE | Facility: CLINIC | Age: 76
End: 2026-06-04
Payer: COMMERCIAL